# Patient Record
Sex: FEMALE | Race: ASIAN | URBAN - METROPOLITAN AREA
[De-identification: names, ages, dates, MRNs, and addresses within clinical notes are randomized per-mention and may not be internally consistent; named-entity substitution may affect disease eponyms.]

---

## 2018-10-25 ENCOUNTER — OFFICE VISIT (OUTPATIENT)
Dept: FAMILY MEDICINE CLINIC | Facility: CLINIC | Age: 43
End: 2018-10-25
Payer: COMMERCIAL

## 2018-10-25 VITALS
HEIGHT: 60 IN | BODY MASS INDEX: 31.49 KG/M2 | WEIGHT: 160.38 LBS | TEMPERATURE: 98 F | HEART RATE: 105 BPM | SYSTOLIC BLOOD PRESSURE: 112 MMHG | OXYGEN SATURATION: 98 % | DIASTOLIC BLOOD PRESSURE: 60 MMHG | RESPIRATION RATE: 16 BRPM

## 2018-10-25 DIAGNOSIS — I83.93 SPIDER VEINS OF BOTH LOWER EXTREMITIES: ICD-10-CM

## 2018-10-25 DIAGNOSIS — F51.04 PSYCHOPHYSIOLOGICAL INSOMNIA: ICD-10-CM

## 2018-10-25 DIAGNOSIS — F32.1 CURRENT MODERATE EPISODE OF MAJOR DEPRESSIVE DISORDER WITHOUT PRIOR EPISODE (HCC): ICD-10-CM

## 2018-10-25 DIAGNOSIS — Z71.9 ENCOUNTER FOR COUNSELING: Primary | ICD-10-CM

## 2018-10-25 DIAGNOSIS — E55.9 VITAMIN D DEFICIENCY: ICD-10-CM

## 2018-10-25 PROCEDURE — 99204 OFFICE O/P NEW MOD 45 MIN: CPT | Performed by: FAMILY MEDICINE

## 2018-10-25 RX ORDER — MULTIVIT-MIN/IRON FUM/FOLIC AC 7.5 MG-4
1 TABLET ORAL DAILY
COMMUNITY

## 2018-10-25 RX ORDER — ZOLPIDEM TARTRATE 5 MG/1
5 TABLET ORAL NIGHTLY PRN
Qty: 30 TABLET | Refills: 0 | Status: SHIPPED | OUTPATIENT
Start: 2018-10-25 | End: 2018-11-24

## 2018-10-25 NOTE — PATIENT INSTRUCTIONS
Please schedule appointment with psychologist.    Please take zolpidem 30 minutes before you go to bed and make sure you get 8 hours of sleep, if you experience any side effects please call.       Depression: Tips to Help Yourself    As your healthcare prov and do what you can. · Be with others. Don’t isolate yourself—you’ll only feel worse. Try to be with other people. And take part in fun activities when you can. Go to a movie, ballgame, [employer] service, or social event.  Talk openly with people you can t especially in the late afternoon. · Use your bed only for sleep and sex. · Don’t spend too much time in bed trying to fall asleep. If you can’t fall asleep, get up and do something (no electronics) until you become tired and drowsy.   · Avoid or limit caf instructions. Zolpidem tablets  Brand Name: Ambien  What is this medicine? ZOLPIDEM (zole PI dem) is used to treat insomnia. This medicine helps you to fall asleep and sleep through the night. How should I use this medicine?   Take this medicine by making phone calls, or sexual activity  Side effects that usually do not require medical attention (report to your doctor or health care professional if they continue or are bothersome):  · dizziness  · drowsiness the day after you take this medicine  · he conditions:  · depression  · history of drug abuse or addiction  · if you often drink alcohol  · liver disease  · lung or breathing disease  · myasthenia gravis  · sleep apnea  · suicidal thoughts, plans, or attempt; a previous suicide attempt by you or a stand or sit up quickly after taking this medicine, especially if you are an older patient. This reduces the risk of dizzy or fainting spells.    If you or your family notice any changes in your behavior, such as new or worsening depression, thoughts of miriam

## 2018-10-25 NOTE — PROGRESS NOTES
Lionel Choudhury is a 44year old female. Patient presents with:  Establish Care: New patient    HPI:   Patient is seen for initial visit.     Mother passed away in December in [facility], was a diabetic, patient states was not able ot go for her  and a few Problem Relation Age of Onset   • Other (hypothyroidism) Father    • Diabetes Mother    • Heart Disorder Mother      SOCIAL HISTORY:         Marital status:       Number of children:1    Occupational History      Not on file    Tobacco Use      Sm

## 2023-05-05 ENCOUNTER — TELEPHONE (OUTPATIENT)
Dept: FAMILY MEDICINE CLINIC | Facility: CLINIC | Age: 48
End: 2023-05-05

## 2023-05-15 ENCOUNTER — PATIENT OUTREACH (OUTPATIENT)
Dept: CASE MANAGEMENT | Age: 48
End: 2023-05-15

## 2023-05-15 NOTE — PROCEDURES
The office order for PCP removal request is Approved and finalized on May 15, 2023.     Thanks,  [facility] [employer]